# Patient Record
Sex: FEMALE | Race: BLACK OR AFRICAN AMERICAN | Employment: OTHER | URBAN - METROPOLITAN AREA
[De-identification: names, ages, dates, MRNs, and addresses within clinical notes are randomized per-mention and may not be internally consistent; named-entity substitution may affect disease eponyms.]

---

## 2017-01-30 ENCOUNTER — HOSPITAL ENCOUNTER (OUTPATIENT)
Dept: MAMMOGRAPHY | Facility: HOSPITAL | Age: 57
Discharge: HOME | End: 2017-01-30
Attending: INTERNAL MEDICINE
Payer: MEDICAID

## 2017-01-30 ENCOUNTER — HOSPITAL ENCOUNTER (OUTPATIENT)
Dept: RESPIRATORY THERAPY | Facility: HOSPITAL | Age: 57
Discharge: HOME | End: 2017-01-30
Attending: INTERNAL MEDICINE
Payer: MEDICAID

## 2017-01-30 DIAGNOSIS — Z12.31 ENCOUNTER FOR SCREENING MAMMOGRAM FOR MALIGNANT NEOPLASM OF BREAST: ICD-10-CM

## 2017-01-30 DIAGNOSIS — R06.02 SHORTNESS OF BREATH: ICD-10-CM

## 2017-01-30 PROCEDURE — 94060 EVALUATION OF WHEEZING: CPT | Performed by: INTERNAL MEDICINE

## 2017-01-30 PROCEDURE — 94729 DIFFUSING CAPACITY: CPT | Performed by: INTERNAL MEDICINE

## 2017-01-30 PROCEDURE — 94726 PLETHYSMOGRAPHY LUNG VOLUMES: CPT | Performed by: INTERNAL MEDICINE

## 2017-01-30 PROCEDURE — 77067 SCR MAMMO BI INCL CAD: CPT

## 2017-02-08 NOTE — ADDENDUM NOTE
Encounter addended by: Michelle Jones MD on: 2/8/2017  4:15 PM<BR>     Documentation filed: Charges VN, Notes Section

## 2017-02-08 NOTE — PROCEDURES
[facility]    Patient's Name Emani Nur MRN [id number]    1960 Pulmonologist Jackie Seay MD   Location [address] [place] [address] PCP Mabel Garcia MD       The PFTs are Normal. The DLCO is low normal.    Please

## 2017-04-04 DIAGNOSIS — D64.9 ANEMIA, UNSPECIFIED TYPE: Primary | ICD-10-CM

## 2017-04-05 ENCOUNTER — OFFICE VISIT (OUTPATIENT)
Dept: HEMATOLOGY/ONCOLOGY | Facility: HOSPITAL | Age: 57
End: 2017-04-05
Attending: INTERNAL MEDICINE
Payer: MEDICAID

## 2017-04-05 ENCOUNTER — LAB ENCOUNTER (OUTPATIENT)
Dept: LAB | Facility: HOSPITAL | Age: 57
End: 2017-04-05
Attending: INTERNAL MEDICINE
Payer: MEDICAID

## 2017-04-05 VITALS
SYSTOLIC BLOOD PRESSURE: 102 MMHG | RESPIRATION RATE: 16 BRPM | HEIGHT: 66 IN | WEIGHT: 127 LBS | BODY MASS INDEX: 20.41 KG/M2 | HEART RATE: 78 BPM | DIASTOLIC BLOOD PRESSURE: 66 MMHG | TEMPERATURE: 99 F

## 2017-04-05 DIAGNOSIS — D64.9 ANEMIA, UNSPECIFIED TYPE: ICD-10-CM

## 2017-04-05 DIAGNOSIS — D50.9 MICROCYTIC ANEMIA: Primary | ICD-10-CM

## 2017-04-05 PROCEDURE — 99204 OFFICE O/P NEW MOD 45 MIN: CPT | Performed by: INTERNAL MEDICINE

## 2017-04-05 PROCEDURE — 84466 ASSAY OF TRANSFERRIN: CPT

## 2017-04-05 PROCEDURE — 83540 ASSAY OF IRON: CPT

## 2017-04-05 PROCEDURE — 85025 COMPLETE CBC W/AUTO DIFF WBC: CPT

## 2017-04-05 PROCEDURE — 80053 COMPREHEN METABOLIC PANEL: CPT

## 2017-04-05 PROCEDURE — 36415 COLL VENOUS BLD VENIPUNCTURE: CPT

## 2017-04-05 NOTE — PROGRESS NOTES
Hematology Consultation Note    Patient Name: Desiree Alas   YOB: 1960   Medical Record Number: [id number]   CSN: [id number]   Consulting Physician: Juan Luis Leal MD  Referring Physician(s): Flakito Perez MD  Date of Consultation: 4/5/20 Lung cancer   • Cancer Maternal Aunt      Lung cancer   • Bleeding Disorders Neg    • Clotting Disorder Neg        Social History:    Social History   Marital Status:   Spouse Name: N/A    Years of Education: N/A  Number of Children: N/A     Tristin Damon Wt 57.607 kg (127 lb)  BMI 20.51 kg/m2    Physical Examination:    General: Patient is alert and oriented x 3, not in acute distress. Psych: Calm, cooperative with appropriate questions and responses  HEENT: EOMs intact. Oropharynx is clear.    Neck: No p loss.    Plan:    1.) Anemia    --Ligia Quinonez symptoms of dyspnea on exertion and mild fatigue might likely be related to her anemia; however, she does admit to a history of asthma in the past which might be contributory  --Patient reports a history of thalassem to at age 63  --Strongly recommended the patient continue with screening mammograms up to age 78 as indicated    Emotional Well Being:    Emotional Well Being discussed, patient aware of support systems available through the [facility]. No acute issues.

## 2017-04-05 NOTE — PATIENT INSTRUCTIONS
Please, go for additional blood work today at the [facility]    Please, return for a follow up with me in ABOUT 1 week

## 2017-04-06 ENCOUNTER — APPOINTMENT (OUTPATIENT)
Dept: LAB | Facility: HOSPITAL | Age: 57
End: 2017-04-06
Attending: INTERNAL MEDICINE
Payer: MEDICAID

## 2017-04-06 DIAGNOSIS — D50.9 MICROCYTIC ANEMIA: ICD-10-CM

## 2017-04-06 PROCEDURE — 83010 ASSAY OF HAPTOGLOBIN QUANT: CPT

## 2017-04-06 PROCEDURE — 82607 VITAMIN B-12: CPT

## 2017-04-06 PROCEDURE — 82728 ASSAY OF FERRITIN: CPT

## 2017-04-06 PROCEDURE — 83021 HEMOGLOBIN CHROMOTOGRAPHY: CPT

## 2017-04-06 PROCEDURE — 82746 ASSAY OF FOLIC ACID SERUM: CPT

## 2017-04-06 PROCEDURE — 83020 HEMOGLOBIN ELECTROPHORESIS: CPT

## 2017-04-06 PROCEDURE — 84443 ASSAY THYROID STIM HORMONE: CPT

## 2017-04-06 PROCEDURE — 83615 LACTATE (LD) (LDH) ENZYME: CPT

## 2017-04-06 PROCEDURE — 36415 COLL VENOUS BLD VENIPUNCTURE: CPT

## 2017-04-06 PROCEDURE — 86880 COOMBS TEST DIRECT: CPT

## 2017-04-13 ENCOUNTER — OFFICE VISIT (OUTPATIENT)
Dept: HEMATOLOGY/ONCOLOGY | Facility: HOSPITAL | Age: 57
End: 2017-04-13
Attending: INTERNAL MEDICINE
Payer: MEDICAID

## 2017-04-13 VITALS
RESPIRATION RATE: 18 BRPM | TEMPERATURE: 98 F | SYSTOLIC BLOOD PRESSURE: 108 MMHG | BODY MASS INDEX: 20.57 KG/M2 | DIASTOLIC BLOOD PRESSURE: 65 MMHG | WEIGHT: 128 LBS | HEART RATE: 75 BPM | HEIGHT: 66 IN

## 2017-04-13 DIAGNOSIS — D50.9 MICROCYTIC ANEMIA: Primary | ICD-10-CM

## 2017-04-13 PROCEDURE — 99215 OFFICE O/P EST HI 40 MIN: CPT | Performed by: INTERNAL MEDICINE

## 2017-04-13 NOTE — PROGRESS NOTES
Hematology Progress Note    Patient Name: Sandra Walter   YOB: 1960   Medical Record Number: [id number]   CSN: [id number]   Consulting Physician: Bernadette Winters MD  Referring Physician(s): Lydia Bartholomew MD  Date of Visit: 4/13/2017     Inell Fear sweats and weight loss. +fatigue  Eyes: Negative for visual disturbance, irritation and redness. Respiratory: Negative for cough, hemoptysis, chest pain, +dyspnea on exertion.   Gastrointestinal: Negative for dysphagia, odynophagia, reflux symptoms, nausea Date/Time   GLU 91 04/05/2017 07:19 AM   BUN 14 04/05/2017 07:19 AM   CREATSERUM 1.02 04/05/2017 07:19 AM   GFRNAA 56* 04/05/2017 07:19 AM   CA 9.5 04/05/2017 07:19 AM   ALB 4.0 04/05/2017 07:19 AM    04/05/2017 07:19 AM   K 4.4 04/05/2017 07:19 AM  ng/mL   61   Vitamin B12 Latest Ref Range: 181-914 pg/mL   809   FOLATE (FOLIC ACID), SERUM Latest Units: ng/mL   17.8   Haptoglobin Latest Ref Range:  mg/dL   141   A/G RATIO Latest Ref Range: 1.0-2.0  1.3     TSH Latest Ref Range: 0.34-5.60 a normal differential suggesting no signs of an underlying bone marrow disorder  --Discussed with patient that since we've ruled out the above other types of anemia, it's likely a thalassemia associated anemia she would not require any additional treatment

## 2017-10-16 ENCOUNTER — LAB ENCOUNTER (OUTPATIENT)
Dept: LAB | Facility: HOSPITAL | Age: 57
End: 2017-10-16
Attending: INTERNAL MEDICINE
Payer: MEDICAID

## 2017-10-16 ENCOUNTER — OFFICE VISIT (OUTPATIENT)
Dept: HEMATOLOGY/ONCOLOGY | Facility: HOSPITAL | Age: 57
End: 2017-10-16
Attending: INTERNAL MEDICINE
Payer: MEDICAID

## 2017-10-16 ENCOUNTER — HOSPITAL ENCOUNTER (OUTPATIENT)
Dept: GENERAL RADIOLOGY | Facility: HOSPITAL | Age: 57
Discharge: HOME | End: 2017-10-16
Attending: INTERNAL MEDICINE
Payer: MEDICAID

## 2017-10-16 VITALS
SYSTOLIC BLOOD PRESSURE: 109 MMHG | RESPIRATION RATE: 16 BRPM | HEART RATE: 81 BPM | DIASTOLIC BLOOD PRESSURE: 68 MMHG | HEIGHT: 66 IN | WEIGHT: 134 LBS | TEMPERATURE: 97 F | BODY MASS INDEX: 21.53 KG/M2

## 2017-10-16 DIAGNOSIS — D64.9 ANEMIA, UNSPECIFIED TYPE: ICD-10-CM

## 2017-10-16 DIAGNOSIS — M25.542 ARTHRALGIA OF BOTH HANDS: ICD-10-CM

## 2017-10-16 DIAGNOSIS — M79.643 HAND PAIN: ICD-10-CM

## 2017-10-16 DIAGNOSIS — D64.9 ANEMIA, UNSPECIFIED TYPE: Primary | ICD-10-CM

## 2017-10-16 DIAGNOSIS — M25.541 ARTHRALGIA OF BOTH HANDS: ICD-10-CM

## 2017-10-16 PROCEDURE — 86038 ANTINUCLEAR ANTIBODIES: CPT

## 2017-10-16 PROCEDURE — 36415 COLL VENOUS BLD VENIPUNCTURE: CPT

## 2017-10-16 PROCEDURE — 73130 X-RAY EXAM OF HAND: CPT | Performed by: INTERNAL MEDICINE

## 2017-10-16 PROCEDURE — 85025 COMPLETE CBC W/AUTO DIFF WBC: CPT

## 2017-10-16 PROCEDURE — 99214 OFFICE O/P EST MOD 30 MIN: CPT | Performed by: INTERNAL MEDICINE

## 2017-10-16 PROCEDURE — 99212 OFFICE O/P EST SF 10 MIN: CPT | Performed by: INTERNAL MEDICINE

## 2017-10-16 NOTE — PATIENT INSTRUCTIONS
Please, go to the [facility] today to have your labs drawn. Recommend using vitamin B12 supplement daily. Recommend taking 1,000 mcg (micrograms) daily.  Then return to clinic for follow up in 3 months

## 2017-10-16 NOTE — PROGRESS NOTES
Hematology Progress Note    Patient Name: Nena Luong   YOB: 1960   Medical Record Number: [id number]   CSN: [id number]  Consulting Physician: Maria Luisa Jay MD  Referring Physician(s): Joel Looney MD  Date of Visit: 10/16/2017     Louisa Beverly awareness in her 20's; unsure about childhood   • Anxiety     situational; with driving   • Asthma      Past Surgical History:  1998: FOOT SURGERY Bilateral  1995: TUBAL LIGATION  Family History   Problem Relation Age of Onset   • Diabetes Mother    • Canc arthralgias, muscle weakness. Neurological: Negative for headaches, dizziness, speech problems, gait problems and weakness. A comprehensive 14 point review of systems was completed. Pertinent positives and negatives noted in the the HPI.      Vital Sig Anemia, unspecified type  (primary encounter diagnosis)  Plan: CBC WITH DIFFERENTIAL WITH PLATELET, GINA,         DIRECT SCREEN    (M25.541,  M25.542) Arthralgia of both hands  Plan: GINA, DIRECT SCREEN  22-year-old F with PMH of alpha thalassemia related an thyroid function, iron studies normal, no evidence of hemolysis, no evidence of nutritional causes of anemia such as folate deficiency    --CBC and CMP on admission show no evidence of any renal dysfunction which might be contributing to her anemia  --Eval   [address], [facility]

## 2017-10-19 ENCOUNTER — TELEPHONE (OUTPATIENT)
Dept: HEMATOLOGY/ONCOLOGY | Facility: HOSPITAL | Age: 57
End: 2017-10-19

## 2017-10-19 DIAGNOSIS — D64.9 ANEMIA: Primary | ICD-10-CM

## 2017-10-19 NOTE — TELEPHONE ENCOUNTER
Per Dr Alon Leon instruction: patient contacted and advised her labs look stable and her GINA indicates no sign of autoimmune component explaining her pain in her hands. She was told she has mild arthritis in her hands based on x ray.  She understands that her

## 2018-01-19 ENCOUNTER — TELEPHONE (OUTPATIENT)
Dept: HEMATOLOGY/ONCOLOGY | Facility: HOSPITAL | Age: 58
End: 2018-01-19

## 2018-01-19 NOTE — TELEPHONE ENCOUNTER
Zhao Jaffe is \"requesting a call back from Dr. Benjy Coronado RN. I have not been taking the Vitamins. Will he still want to see me or should I reschedule this appointment? \" I assured her I would send her message. I called to confirm the visit for 1/22.

## 2018-01-22 ENCOUNTER — OFFICE VISIT (OUTPATIENT)
Dept: HEMATOLOGY/ONCOLOGY | Facility: HOSPITAL | Age: 58
End: 2018-01-22
Attending: INTERNAL MEDICINE
Payer: MEDICAID

## 2018-01-22 ENCOUNTER — LAB ENCOUNTER (OUTPATIENT)
Dept: LAB | Facility: HOSPITAL | Age: 58
End: 2018-01-22
Attending: INTERNAL MEDICINE
Payer: MEDICAID

## 2018-01-22 VITALS
SYSTOLIC BLOOD PRESSURE: 106 MMHG | RESPIRATION RATE: 16 BRPM | BODY MASS INDEX: 20.73 KG/M2 | HEIGHT: 66 IN | HEART RATE: 93 BPM | TEMPERATURE: 97 F | WEIGHT: 129 LBS | DIASTOLIC BLOOD PRESSURE: 40 MMHG

## 2018-01-22 DIAGNOSIS — E53.8 VITAMIN B12 DEFICIENCY: ICD-10-CM

## 2018-01-22 DIAGNOSIS — D64.9 ANEMIA: ICD-10-CM

## 2018-01-22 DIAGNOSIS — D50.9 MICROCYTIC ANEMIA: Primary | ICD-10-CM

## 2018-01-22 LAB
BASOPHILS # BLD: 0 K/UL (ref 0–0.2)
BASOPHILS NFR BLD: 1 %
EOSINOPHIL # BLD: 0.2 K/UL (ref 0–0.7)
EOSINOPHIL NFR BLD: 5 %
ERYTHROCYTE [DISTWIDTH] IN BLOOD BY AUTOMATED COUNT: 15.6 % (ref 11–15)
HCT VFR BLD AUTO: 32.2 % (ref 35–48)
HGB BLD-MCNC: 10.1 G/DL (ref 12–16)
LYMPHOCYTES # BLD: 1.3 K/UL (ref 1–4)
LYMPHOCYTES NFR BLD: 27 %
MCH RBC QN AUTO: 21.6 PG (ref 27–32)
MCHC RBC AUTO-ENTMCNC: 31.5 G/DL (ref 32–37)
MCV RBC AUTO: 68.6 FL (ref 80–100)
MONOCYTES # BLD: 0.7 K/UL (ref 0–1)
MONOCYTES NFR BLD: 15 %
NEUTROPHILS # BLD AUTO: 2.6 K/UL (ref 1.8–7.7)
NEUTROPHILS NFR BLD: 53 %
PLATELET # BLD AUTO: 172 K/UL (ref 140–400)
PMV BLD AUTO: 9.6 FL (ref 7.4–10.3)
RBC # BLD AUTO: 4.7 M/UL (ref 3.7–5.4)
WBC # BLD AUTO: 4.9 K/UL (ref 4–11)

## 2018-01-22 PROCEDURE — 85025 COMPLETE CBC W/AUTO DIFF WBC: CPT

## 2018-01-22 PROCEDURE — 85060 BLOOD SMEAR INTERPRETATION: CPT

## 2018-01-22 PROCEDURE — 36415 COLL VENOUS BLD VENIPUNCTURE: CPT

## 2018-01-22 PROCEDURE — 99212 OFFICE O/P EST SF 10 MIN: CPT | Performed by: INTERNAL MEDICINE

## 2018-01-22 PROCEDURE — 99213 OFFICE O/P EST LOW 20 MIN: CPT | Performed by: INTERNAL MEDICINE

## 2018-01-22 NOTE — PROGRESS NOTES
Hematology Progress Note    Patient Name: Ruthy Osborne   YOB: 1960   Medical Record Number: [id number]   CSN: [id number]  Consulting Physician: Sonny Thayer MD  Referring Physician(s): Shirley Apley, MD  Date of Visit: 1/22/2018    Chief systems is otherwise negative.     Past Medical History:   Diagnosis Date   • Anemia     awareness in her 19's; unsure about childhood   • Anxiety     situational; with driving   • Asthma      Past Surgical History:  1998: FOOT SURGERY Bilateral  1995: TUBA lymphadenopathy. Musculoskeletal: Negative for myalgias, arthralgias, muscle weakness. Neurological: Negative for headaches, dizziness, speech problems, gait problems and weakness. A comprehensive 14 point review of systems was completed.   Pertinent 19 04/05/2017 07:19 AM         Impression:    (D50.9) Microcytic anemia  (primary encounter diagnosis)  Plan: CBC WITH DIFFERENTIAL WITH PLATELET, VITAMIN         B12    (E53.8) Vitamin B12 deficiency  Plan: CBC WITH DIFFERENTIAL WITH PLATELET, VITAMIN evidence of any renal dysfunction which might be contributing to her anemia  --Evaluation of patient's CBC shows a normal WBC and platelets, with a normal differential suggesting no signs of an underlying bone marrow disorder    --Also discussed this might

## 2018-07-22 ENCOUNTER — LAB ENCOUNTER (OUTPATIENT)
Dept: LAB | Facility: HOSPITAL | Age: 58
End: 2018-07-22
Attending: INTERNAL MEDICINE
Payer: MEDICAID

## 2018-07-22 DIAGNOSIS — E53.8 VITAMIN B12 DEFICIENCY: ICD-10-CM

## 2018-07-22 DIAGNOSIS — D50.9 MICROCYTIC ANEMIA: ICD-10-CM

## 2018-07-22 DIAGNOSIS — D56.3 THALASSEMIA MINOR: ICD-10-CM

## 2018-07-22 LAB
BASOPHILS # BLD: 0 K/UL (ref 0–0.2)
BASOPHILS NFR BLD: 1 %
EOSINOPHIL # BLD: 0.2 K/UL (ref 0–0.7)
EOSINOPHIL NFR BLD: 4 %
ERYTHROCYTE [DISTWIDTH] IN BLOOD BY AUTOMATED COUNT: 15.9 % (ref 11–15)
HCT VFR BLD AUTO: 33.3 % (ref 35–48)
HGB BLD-MCNC: 10.5 G/DL (ref 12–16)
LYMPHOCYTES # BLD: 1.5 K/UL (ref 1–4)
LYMPHOCYTES NFR BLD: 25 %
MCH RBC QN AUTO: 21.9 PG (ref 27–32)
MCHC RBC AUTO-ENTMCNC: 31.5 G/DL (ref 32–37)
MCV RBC AUTO: 69.5 FL (ref 80–100)
MONOCYTES # BLD: 0.6 K/UL (ref 0–1)
MONOCYTES NFR BLD: 9 %
NEUTROPHILS # BLD AUTO: 3.7 K/UL (ref 1.8–7.7)
NEUTROPHILS NFR BLD: 61 %
PLATELET # BLD AUTO: 212 K/UL (ref 140–400)
PMV BLD AUTO: 9.4 FL (ref 7.4–10.3)
RBC # BLD AUTO: 4.79 M/UL (ref 3.7–5.4)
VIT B12 SERPL-MCNC: 474 PG/ML (ref 181–914)
WBC # BLD AUTO: 6.1 K/UL (ref 4–11)

## 2018-07-22 PROCEDURE — 82607 VITAMIN B-12: CPT

## 2018-07-22 PROCEDURE — 85025 COMPLETE CBC W/AUTO DIFF WBC: CPT

## 2018-07-22 PROCEDURE — 36415 COLL VENOUS BLD VENIPUNCTURE: CPT

## 2018-07-22 PROCEDURE — 82728 ASSAY OF FERRITIN: CPT

## 2018-07-22 PROCEDURE — 84466 ASSAY OF TRANSFERRIN: CPT

## 2018-07-22 PROCEDURE — 83540 ASSAY OF IRON: CPT

## 2018-07-23 ENCOUNTER — OFFICE VISIT (OUTPATIENT)
Dept: HEMATOLOGY/ONCOLOGY | Facility: HOSPITAL | Age: 58
End: 2018-07-23
Attending: INTERNAL MEDICINE
Payer: MEDICAID

## 2018-07-23 VITALS
TEMPERATURE: 98 F | SYSTOLIC BLOOD PRESSURE: 99 MMHG | HEART RATE: 74 BPM | WEIGHT: 135 LBS | RESPIRATION RATE: 16 BRPM | DIASTOLIC BLOOD PRESSURE: 64 MMHG | BODY MASS INDEX: 21.69 KG/M2 | HEIGHT: 66 IN

## 2018-07-23 DIAGNOSIS — D56.3 THALASSEMIA MINOR: ICD-10-CM

## 2018-07-23 DIAGNOSIS — E53.8 VITAMIN B12 DEFICIENCY: ICD-10-CM

## 2018-07-23 DIAGNOSIS — D50.9 MICROCYTIC ANEMIA: Primary | ICD-10-CM

## 2018-07-23 LAB
FERRITIN SERPL IA-MCNC: 62 NG/ML (ref 11–307)
IRON SATN MFR SERPL: 12 % (ref 15–50)
IRON SERPL-MCNC: 38 MCG/DL (ref 28–170)
TIBC SERPL-MCNC: 330 MCG/DL (ref 228–428)
TRANSFERRIN SERPL-MCNC: 250 MG/DL (ref 192–382)

## 2018-07-23 PROCEDURE — 99214 OFFICE O/P EST MOD 30 MIN: CPT | Performed by: INTERNAL MEDICINE

## 2018-07-23 NOTE — PROGRESS NOTES
Hematology Progress Note    Patient Name: Kali Williamson   YOB: 1960   Medical Record Number: [id number]   CSN: [id number]  Consulting Physician: Pio Hurst MD  Referring Physician(s): Jose Alejandro Alegria MD  Date of Visit: 7/23/2018    Chief any active blood loss from any orifice. Review of systems is otherwise negative for any systemic signs of illness.     Past Medical History:   Diagnosis Date   • Anemia     awareness in her 19's; unsure about childhood   • Anxiety     situational; with dri lesions, and pruritus. Hematologic/lymphatic: Negative for easy bruising, bleeding, and lymphadenopathy. Musculoskeletal: Negative for myalgias, arthralgias, muscle weakness.   Neurological: Negative for headaches, dizziness, speech problems, gait proble 04/05/2017 07:19 AM   ALKPHO 86 04/05/2017 07:19 AM   AST 25 04/05/2017 07:19 AM   ALT 19 04/05/2017 07:19 AM         Impression:    (D50.9) Microcytic anemia  (primary encounter diagnosis)  Plan: IRON AND TIBC, FERRITIN, CBC WITH DIFFERENTIAL         WITH beta thalassemia either minor based on her -American background ethnicity; however, lab testing consistent with alpha thalassemia. Does not require tx.       --She has no evidence of  iron deficiency anemia by testing and takes no medications that co

## 2018-08-21 ENCOUNTER — HOSPITAL ENCOUNTER (EMERGENCY)
Facility: HOSPITAL | Age: 58
Discharge: HOME | End: 2018-08-21
Attending: PHYSICIAN ASSISTANT
Payer: MEDICAID

## 2018-08-21 VITALS
BODY MASS INDEX: 20.89 KG/M2 | HEART RATE: 76 BPM | OXYGEN SATURATION: 100 % | WEIGHT: 130 LBS | DIASTOLIC BLOOD PRESSURE: 69 MMHG | TEMPERATURE: 97 F | RESPIRATION RATE: 20 BRPM | HEIGHT: 66 IN | SYSTOLIC BLOOD PRESSURE: 106 MMHG

## 2018-08-21 DIAGNOSIS — J02.9 PHARYNGITIS, UNSPECIFIED ETIOLOGY: Primary | ICD-10-CM

## 2018-08-21 LAB — S PYO AG THROAT QL: NEGATIVE

## 2018-08-21 PROCEDURE — 87430 STREP A AG IA: CPT

## 2018-08-21 PROCEDURE — 99283 EMERGENCY DEPT VISIT LOW MDM: CPT

## 2018-08-21 RX ORDER — IBUPROFEN 600 MG/1
TABLET ORAL
Qty: 20 TABLET | Refills: 0 | Status: SHIPPED | OUTPATIENT
Start: 2018-08-21 | End: 2019-04-12

## 2018-08-22 NOTE — ED NOTES
Pr states having a sore throat for 5days. Said that it hurts with swallowing. Also c/o bilat ear pain. Denied being around sick people or fevers.

## 2018-08-22 NOTE — ED PROVIDER NOTES
Patient Seen in: [facility] AND [facility] Emergency Department    History   Patient presents with:  Sore Throat    Stated Complaint: sore throat     HPI    28-year-old female presents with chief complaint of sore throat. Onset 4 days ago.   Patient denies sick as otherwise stated in HPI.     Physical Exam   ED Triage Vitals [08/21/18 2058]  BP: 104/75  Pulse: 71  Resp: 18  Temp: 97.7 °F (36.5 °C)  Temp src: Oral  SpO2: 100 %  O2 Device: None (Room air)    Current:/75   Pulse 71   Temp 97.7 °F (36.5 °C) (Ora remained stable over serial reexaminations as previously documented. Results reviewed and need for follow-up discussed with patient.     Disposition and Plan     Clinical Impression:  Pharyngitis, unspecified etiology  (primary encounter diagnosis)    Disp

## 2018-08-22 NOTE — ED INITIAL ASSESSMENT (HPI)
Pt reports difficulty in swallowing, soreness and dryness in throat x4 days. Pt denies fevers/chills.

## 2018-09-01 ENCOUNTER — HOSPITAL ENCOUNTER (OUTPATIENT)
Dept: GENERAL RADIOLOGY | Facility: HOSPITAL | Age: 58
Discharge: HOME | End: 2018-09-01
Attending: INTERNAL MEDICINE
Payer: MEDICAID

## 2018-09-01 DIAGNOSIS — F45.8 OTHER SOMATOFORM DISORDERS: ICD-10-CM

## 2018-09-01 PROCEDURE — 74220 X-RAY XM ESOPHAGUS 1CNTRST: CPT | Performed by: INTERNAL MEDICINE

## 2018-09-10 ENCOUNTER — OFFICE VISIT (OUTPATIENT)
Dept: OTOLARYNGOLOGY | Facility: CLINIC | Age: 58
End: 2018-09-10
Payer: MEDICAID

## 2018-09-10 VITALS
WEIGHT: 132 LBS | DIASTOLIC BLOOD PRESSURE: 60 MMHG | TEMPERATURE: 97 F | HEIGHT: 66 IN | SYSTOLIC BLOOD PRESSURE: 90 MMHG | BODY MASS INDEX: 21.21 KG/M2

## 2018-09-10 DIAGNOSIS — K21.9 LARYNGOPHARYNGEAL REFLUX (LPR): Primary | ICD-10-CM

## 2018-09-10 PROBLEM — R68.2 DRY MOUTH, UNSPECIFIED: Status: ACTIVE | Noted: 2018-09-10

## 2018-09-10 PROCEDURE — 31575 DIAGNOSTIC LARYNGOSCOPY: CPT | Performed by: OTOLARYNGOLOGY

## 2018-09-10 PROCEDURE — 99244 OFF/OP CNSLTJ NEW/EST MOD 40: CPT | Performed by: OTOLARYNGOLOGY

## 2018-09-10 PROCEDURE — 99212 OFFICE O/P EST SF 10 MIN: CPT | Performed by: OTOLARYNGOLOGY

## 2018-09-10 RX ORDER — OMEPRAZOLE 40 MG/1
40 CAPSULE, DELAYED RELEASE ORAL DAILY
Qty: 30 CAPSULE | Refills: 2 | Status: SHIPPED | OUTPATIENT
Start: 2018-09-10 | End: 2019-04-12

## 2018-09-10 NOTE — PROGRESS NOTES
Leon Irene is a 66year old female. Patient presents with:  Throat Problem: throat pain for a month, c/o dry throat for a while      HISTORY OF PRESENT ILLNESS  9/10/2018  Patient presents for evaluation of a swallowing issue.  This has been going on f Alcohol/week: 0.0 oz      Drug use: No      Sexual activity: Not on file    Other Topics      Concerns:        Not on file    Social History Narrative      Patient is remarried; after 6 yrs to her spouse, Houston Michael.  She lives with her , adult daughter, Conjunctiva - Right: Normal, Left: Normal. Pupil - Right: Normal, Left: Normal.     Neurological Normal Memory - Normal. Cranial nerves - Cranial nerves II through XII grossly intact.    Head/Face Normal Facial features - Normal. Eyebrows - Normal. Skull - (40 mg total) by mouth daily.  Before a meal, Disp: 30 capsule, Rfl: 2  •  ibuprofen 600 MG Oral Tab, Take 1 tablet (600 mg total) by mouth every 6 hours with food, Disp: 20 tablet, Rfl: 0  •  Phenol-Glycerin (CHLORASEPTIC MAX SORE THROAT) 1.5-33 % Mouth/Th

## 2018-10-27 ENCOUNTER — HOSPITAL ENCOUNTER (OUTPATIENT)
Dept: MAMMOGRAPHY | Facility: HOSPITAL | Age: 58
Discharge: HOME | End: 2018-10-27
Attending: INTERNAL MEDICINE
Payer: COMMERCIAL

## 2018-10-27 DIAGNOSIS — Z12.39 ENCOUNTER FOR SCREENING FOR MALIGNANT NEOPLASM OF BREAST: ICD-10-CM

## 2018-10-27 PROCEDURE — 77067 SCR MAMMO BI INCL CAD: CPT | Performed by: INTERNAL MEDICINE

## 2018-10-27 PROCEDURE — 77063 BREAST TOMOSYNTHESIS BI: CPT | Performed by: INTERNAL MEDICINE

## 2018-10-29 ENCOUNTER — TELEPHONE (OUTPATIENT)
Dept: OTOLARYNGOLOGY | Facility: CLINIC | Age: 58
End: 2018-10-29

## 2018-10-29 RX ORDER — OMEPRAZOLE 40 MG/1
40 CAPSULE, DELAYED RELEASE ORAL DAILY
Qty: 30 CAPSULE | Refills: 2 | Status: SHIPPED | OUTPATIENT
Start: 2018-10-29 | End: 2019-07-22 | Stop reason: ALTCHOICE

## 2018-10-29 NOTE — TELEPHONE ENCOUNTER
Pt asking for omeprazole prescription to be sent to [facility], states her insurance will not cover [facility]. Pls advise thank you.

## 2019-04-12 PROBLEM — Z80.0 FAMILY HISTORY OF COLON CANCER: Status: ACTIVE | Noted: 2019-04-12

## 2019-04-22 PROCEDURE — 88305 TISSUE EXAM BY PATHOLOGIST: CPT | Performed by: INTERNAL MEDICINE

## 2019-06-17 NOTE — MR AVS SNAPSHOT
Nena Luong   2017 8:30 AM   Office Visit   MRN:  [id number]    Description:  Female : 1960   Provider:  Christelle Escalona   Department:  [facility] AND [facility] Hematology Oncology              Visit Summary      Primary Visit Diagnosis visit,  view other health information, and more. To sign up or find more information, go to [email or web address] and click on the Sign Up Now link in the [employer] box.      Enter your [employer] Activation Code exactly as it appears below along with yo  used

## 2019-07-04 ENCOUNTER — HOSPITAL ENCOUNTER (EMERGENCY)
Facility: HOSPITAL | Age: 59
Discharge: HOME | End: 2019-07-04
Attending: EMERGENCY MEDICINE
Payer: COMMERCIAL

## 2019-07-04 VITALS
OXYGEN SATURATION: 100 % | HEIGHT: 66 IN | DIASTOLIC BLOOD PRESSURE: 65 MMHG | RESPIRATION RATE: 16 BRPM | SYSTOLIC BLOOD PRESSURE: 108 MMHG | HEART RATE: 71 BPM | BODY MASS INDEX: 21.69 KG/M2 | WEIGHT: 135 LBS | TEMPERATURE: 99 F

## 2019-07-04 DIAGNOSIS — L03.119 CELLULITIS OF LOWER EXTREMITY, UNSPECIFIED LATERALITY: ICD-10-CM

## 2019-07-04 DIAGNOSIS — L30.9 DERMATITIS: Primary | ICD-10-CM

## 2019-07-04 PROCEDURE — 99283 EMERGENCY DEPT VISIT LOW MDM: CPT

## 2019-07-04 RX ORDER — METHYLPREDNISOLONE 4 MG/1
TABLET ORAL
Qty: 1 PACKAGE | Refills: 0 | Status: SHIPPED | OUTPATIENT
Start: 2019-07-04 | End: 2019-07-09

## 2019-07-04 RX ORDER — CEFADROXIL 500 MG/1
500 CAPSULE ORAL 2 TIMES DAILY
Qty: 20 CAPSULE | Refills: 0 | Status: SHIPPED | OUTPATIENT
Start: 2019-07-04 | End: 2019-07-14

## 2019-07-04 NOTE — ED PROVIDER NOTES
Patient Seen in: [facility] AND [facility] Emergency Department    History   Patient presents with:  Swelling Edema (cardiovascular, metabolic)    Stated Complaint: L leg swelling     HPI    Patient is 68-year-old female who presents with right lower leg redness motor. Normal gait  SKIN: +erythema to right anterior leg. No warmth/fluctuance/induration. No bulla. Erythema 7 x 3 cm. ED Course   Labs Reviewed - No data to display    MDM   Pt showed pictures of inflammation over last few days.  Appears to be stable

## 2019-07-04 NOTE — ED INITIAL ASSESSMENT (HPI)
Pt has some swelling and discoloration to the right shin. States, \"I think something bit me. \"  Incident occurred on Saturday and continues to get worse.

## 2019-07-22 ENCOUNTER — LAB ENCOUNTER (OUTPATIENT)
Dept: LAB | Facility: HOSPITAL | Age: 59
End: 2019-07-22
Attending: INTERNAL MEDICINE
Payer: COMMERCIAL

## 2019-07-22 ENCOUNTER — OFFICE VISIT (OUTPATIENT)
Dept: HEMATOLOGY/ONCOLOGY | Facility: HOSPITAL | Age: 59
End: 2019-07-22
Attending: INTERNAL MEDICINE
Payer: COMMERCIAL

## 2019-07-22 VITALS
TEMPERATURE: 98 F | OXYGEN SATURATION: 100 % | HEIGHT: 66 IN | RESPIRATION RATE: 16 BRPM | BODY MASS INDEX: 22.82 KG/M2 | HEART RATE: 68 BPM | SYSTOLIC BLOOD PRESSURE: 92 MMHG | DIASTOLIC BLOOD PRESSURE: 55 MMHG | WEIGHT: 142 LBS

## 2019-07-22 DIAGNOSIS — D50.9 MICROCYTIC ANEMIA: Primary | ICD-10-CM

## 2019-07-22 DIAGNOSIS — E53.8 VITAMIN B12 DEFICIENCY: ICD-10-CM

## 2019-07-22 DIAGNOSIS — D56.3 THALASSEMIA MINOR: ICD-10-CM

## 2019-07-22 DIAGNOSIS — D50.9 MICROCYTIC ANEMIA: ICD-10-CM

## 2019-07-22 LAB
BASOPHILS # BLD AUTO: 0.03 X10(3) UL (ref 0–0.2)
BASOPHILS NFR BLD AUTO: 0.5 %
DEPRECATED HBV CORE AB SER IA-ACNC: 60.1 NG/ML (ref 18–340)
DEPRECATED RDW RBC AUTO: 42.4 FL (ref 35.1–46.3)
EOSINOPHIL # BLD AUTO: 0.24 X10(3) UL (ref 0–0.7)
EOSINOPHIL NFR BLD AUTO: 4.3 %
ERYTHROCYTE [DISTWIDTH] IN BLOOD BY AUTOMATED COUNT: 16.4 % (ref 11–15)
FOLATE SERPL-MCNC: 19.4 NG/ML (ref 8.7–?)
HAPTOGLOB SERPL-MCNC: 145 MG/DL (ref 30–200)
HCT VFR BLD AUTO: 33.8 % (ref 35–48)
HGB BLD-MCNC: 10.4 G/DL (ref 12–16)
HGB RETIC QN AUTO: 24 PG (ref 28.2–36.6)
IMM GRANULOCYTES # BLD AUTO: 0.01 X10(3) UL (ref 0–1)
IMM GRANULOCYTES NFR BLD: 0.2 %
IMM RETICS NFR: 0.12 RATIO (ref 0.1–0.3)
IRON SATURATION: 17 % (ref 15–50)
IRON SERPL-MCNC: 66 UG/DL (ref 50–170)
LDH SERPL L TO P-CCNC: 212 U/L (ref 84–246)
LYMPHOCYTES # BLD AUTO: 1.58 X10(3) UL (ref 1–4)
LYMPHOCYTES NFR BLD AUTO: 28.1 %
MCH RBC QN AUTO: 22.3 PG (ref 26–34)
MCHC RBC AUTO-ENTMCNC: 30.8 G/DL (ref 31–37)
MCV RBC AUTO: 72.4 FL (ref 80–100)
MONOCYTES # BLD AUTO: 0.58 X10(3) UL (ref 0.1–1)
MONOCYTES NFR BLD AUTO: 10.3 %
NEUTROPHILS # BLD AUTO: 3.19 X10 (3) UL (ref 1.5–7.7)
NEUTROPHILS # BLD AUTO: 3.19 X10(3) UL (ref 1.5–7.7)
NEUTROPHILS NFR BLD AUTO: 56.6 %
PLATELET # BLD AUTO: 221 10(3)UL (ref 150–450)
RBC # BLD AUTO: 4.67 X10(6)UL (ref 3.8–5.3)
RETICS # AUTO: 47.2 X10(3) UL (ref 22.5–147.5)
RETICS/RBC NFR AUTO: 1 % (ref 0.5–2.5)
TOTAL IRON BINDING CAPACITY: 386 UG/DL (ref 240–450)
TRANSFERRIN SERPL-MCNC: 259 MG/DL (ref 200–360)
VIT B12 SERPL-MCNC: 729 PG/ML (ref 193–986)
WBC # BLD AUTO: 5.6 X10(3) UL (ref 4–11)

## 2019-07-22 PROCEDURE — 82728 ASSAY OF FERRITIN: CPT

## 2019-07-22 PROCEDURE — 83615 LACTATE (LD) (LDH) ENZYME: CPT

## 2019-07-22 PROCEDURE — 85025 COMPLETE CBC W/AUTO DIFF WBC: CPT

## 2019-07-22 PROCEDURE — 84466 ASSAY OF TRANSFERRIN: CPT

## 2019-07-22 PROCEDURE — 82746 ASSAY OF FOLIC ACID SERUM: CPT

## 2019-07-22 PROCEDURE — 83540 ASSAY OF IRON: CPT

## 2019-07-22 PROCEDURE — 99214 OFFICE O/P EST MOD 30 MIN: CPT | Performed by: INTERNAL MEDICINE

## 2019-07-22 PROCEDURE — 82607 VITAMIN B-12: CPT

## 2019-07-22 PROCEDURE — 83010 ASSAY OF HAPTOGLOBIN QUANT: CPT

## 2019-07-22 PROCEDURE — 36415 COLL VENOUS BLD VENIPUNCTURE: CPT

## 2019-07-22 PROCEDURE — 85045 AUTOMATED RETICULOCYTE COUNT: CPT

## 2019-07-22 NOTE — PROGRESS NOTES
Hematology Progress Note    Patient Name: Nessa Ricci   YOB: 1960   Medical Record Number: [id number]   CSN: [id number]  Consulting Physician: Tj Calhoun MD  Referring Physician(s): Rosetta Peterson MD  Date of Visit: 7/22/2019    Chief findings recommended for annual follow-up in 1 year. She reports being in her usual state of health since her last visit. Patient denies blood loss from any orifice.   Patient denies symptoms of anemia such as chest pain or fatigue, mentions occasiona and Sexual Activity      Alcohol use: No        Alcohol/week: 0.0 standard drinks      Drug use: No      Sexual activity: Not on file    Lifestyle      Physical activity:        Days per week: Not on file        Minutes per session: Not on file      Stress arthralgias, muscle weakness. Neurological: Negative for headaches, dizziness, speech problems, gait problems and weakness. A comprehensive 14 point review of systems was completed. Pertinent positives and negatives noted in the the HPI.      Vital Sig ALT 19 04/05/2017 07:19 AM         Impression:    (D50.9) Microcytic anemia  (primary encounter diagnosis)  Plan: RETICULOCYTE COUNT    (D56.3) Thalassemia minor  64year old F with PMH of thalassemia related anemia presents for continued follow up of m shows a normal WBC and platelets, with a normal differential suggesting no signs of an underlying bone marrow disorder    --No concern for GI losses as her last c-scope was in 4/2019; no evidence of a bleeding source via colonoscopy or EGD.  Pt found to hav

## 2019-12-11 ENCOUNTER — HOSPITAL ENCOUNTER (OUTPATIENT)
Dept: MAMMOGRAPHY | Age: 59
Discharge: HOME | End: 2019-12-11
Attending: INTERNAL MEDICINE
Payer: COMMERCIAL

## 2019-12-11 DIAGNOSIS — Z12.31 ENCOUNTER FOR SCREENING MAMMOGRAM FOR MALIGNANT NEOPLASM OF BREAST: ICD-10-CM

## 2019-12-11 PROCEDURE — 77067 SCR MAMMO BI INCL CAD: CPT | Performed by: INTERNAL MEDICINE

## 2019-12-11 PROCEDURE — 77063 BREAST TOMOSYNTHESIS BI: CPT | Performed by: INTERNAL MEDICINE

## 2020-07-16 ENCOUNTER — OFFICE VISIT (OUTPATIENT)
Dept: OBGYN CLINIC | Facility: CLINIC | Age: 60
End: 2020-07-16
Payer: COMMERCIAL

## 2020-07-16 VITALS
SYSTOLIC BLOOD PRESSURE: 100 MMHG | DIASTOLIC BLOOD PRESSURE: 64 MMHG | BODY MASS INDEX: 22 KG/M2 | WEIGHT: 139.19 LBS | HEART RATE: 64 BPM

## 2020-07-16 DIAGNOSIS — Z12.4 CERVICAL CANCER SCREENING: ICD-10-CM

## 2020-07-16 DIAGNOSIS — N95.2 VAGINAL ATROPHY: ICD-10-CM

## 2020-07-16 DIAGNOSIS — N90.89 VULVAR IRRITATION: ICD-10-CM

## 2020-07-16 DIAGNOSIS — Z01.419 ENCOUNTER FOR GYNECOLOGICAL EXAMINATION WITHOUT ABNORMAL FINDING: Primary | ICD-10-CM

## 2020-07-16 PROCEDURE — 3074F SYST BP LT 130 MM HG: CPT | Performed by: OBSTETRICS & GYNECOLOGY

## 2020-07-16 PROCEDURE — 99386 PREV VISIT NEW AGE 40-64: CPT | Performed by: OBSTETRICS & GYNECOLOGY

## 2020-07-16 PROCEDURE — 3078F DIAST BP <80 MM HG: CPT | Performed by: OBSTETRICS & GYNECOLOGY

## 2020-07-16 RX ORDER — ESTRADIOL 0.1 MG/G
2 CREAM VAGINAL
Qty: 1 TUBE | Refills: 5 | Status: SHIPPED | OUTPATIENT
Start: 2020-07-16 | End: 2021-07-08 | Stop reason: ALTCHOICE

## 2020-07-16 RX ORDER — ESTRADIOL 10 UG/1
INSERT VAGINAL
COMMUNITY
End: 2020-07-16

## 2020-07-16 NOTE — PROGRESS NOTES
Well Woman Exam    HPI:  The patient is a 63yo female who presents today for annual exam. She is doing well. She has not had pap smear in 2-3 years. She has had an abnormal in about 2005.  Pt complains of vaginal atrophy for which she is on vaginal estrogen No      Sexual activity: Not on file    Lifestyle      Physical activity:        Days per week: Not on file        Minutes per session: Not on file      Stress: Not on file    Relationships      Social connections:        Talks on phone: Not on file incontinence  Heme: Denies easy bruising   Skin/Breast:  Denies any breast pain, lumps, or discharge.    Neurological:  denies headaches, blurred or double vision   Psychiatric: denies depression or anxiety       07/16/20  1331   BP: 100/64   Pulse: 64

## 2020-07-17 LAB — HPV I/H RISK 1 DNA SPEC QL NAA+PROBE: NEGATIVE

## 2020-07-19 LAB
GENITAL VAGINOSIS SCREEN: NEGATIVE
TRICHOMONAS SCREEN: NEGATIVE

## 2020-07-22 ENCOUNTER — HOSPITAL ENCOUNTER (EMERGENCY)
Facility: HOSPITAL | Age: 60
Discharge: HOME | End: 2020-07-22
Payer: COMMERCIAL

## 2020-07-22 VITALS
DIASTOLIC BLOOD PRESSURE: 77 MMHG | HEART RATE: 71 BPM | OXYGEN SATURATION: 99 % | TEMPERATURE: 99 F | RESPIRATION RATE: 16 BRPM | SYSTOLIC BLOOD PRESSURE: 118 MMHG

## 2020-07-22 DIAGNOSIS — U07.1 COVID-19: Primary | ICD-10-CM

## 2020-07-22 LAB — SARS-COV-2 RNA RESP QL NAA+PROBE: DETECTED

## 2020-07-22 PROCEDURE — 93010 ELECTROCARDIOGRAM REPORT: CPT | Performed by: INTERNAL MEDICINE

## 2020-07-22 PROCEDURE — 99283 EMERGENCY DEPT VISIT LOW MDM: CPT

## 2020-07-22 PROCEDURE — 93005 ELECTROCARDIOGRAM TRACING: CPT

## 2020-07-22 NOTE — ED INITIAL ASSESSMENT (HPI)
Patient presents with cough for a few days. Patient denies fever. Patient's  positive with Covid-19.

## 2020-07-31 NOTE — PROGRESS NOTES
It was good to see you in the office. Your most recent Pap Smear and HPV were negative. Please make sure you call to make an appointment for an annual exam in one year. If you have any questions or concerns please do not hesitate to contact the office.  Hop

## 2021-04-21 ENCOUNTER — TELEPHONE (OUTPATIENT)
Dept: INTERNAL MEDICINE CLINIC | Facility: CLINIC | Age: 61
End: 2021-04-21

## 2021-04-21 DIAGNOSIS — Z12.31 BREAST CANCER SCREENING BY MAMMOGRAM: Primary | ICD-10-CM

## 2021-04-21 NOTE — TELEPHONE ENCOUNTER
I'm sorry. This is a new patient and I cannot write orders until I see her. She could probably get the order from one of the gynecologists in Dr. Rick Brown office.

## 2021-04-21 NOTE — TELEPHONE ENCOUNTER
Patient is requesting an order for an Mammogram.  Please send message to Simfinit when approved. Patient has schedule an appointment with Dr. Tamela Desir on 7/8/21.

## 2021-04-22 NOTE — TELEPHONE ENCOUNTER
Spoke to patient and stated to her that since she's a new patient we can't order a mammogram for her until she's seen. I suggested she call 's office and she said  is no longer available.  She will wait until she sees

## 2021-07-08 ENCOUNTER — OFFICE VISIT (OUTPATIENT)
Dept: INTERNAL MEDICINE CLINIC | Facility: CLINIC | Age: 61
End: 2021-07-08
Payer: COMMERCIAL

## 2021-07-08 VITALS
WEIGHT: 143 LBS | HEIGHT: 66 IN | HEART RATE: 69 BPM | DIASTOLIC BLOOD PRESSURE: 63 MMHG | BODY MASS INDEX: 22.98 KG/M2 | SYSTOLIC BLOOD PRESSURE: 101 MMHG

## 2021-07-08 DIAGNOSIS — F32.A MILD DEPRESSION: ICD-10-CM

## 2021-07-08 DIAGNOSIS — Z00.00 ROUTINE HEALTH MAINTENANCE: Primary | ICD-10-CM

## 2021-07-08 DIAGNOSIS — Z12.31 BREAST CANCER SCREENING BY MAMMOGRAM: ICD-10-CM

## 2021-07-08 DIAGNOSIS — M67.471 GANGLION CYST OF RIGHT FOOT: ICD-10-CM

## 2021-07-08 DIAGNOSIS — D56.3 THALASSEMIA TRAIT: ICD-10-CM

## 2021-07-08 PROBLEM — R68.2 DRY MOUTH, UNSPECIFIED: Status: RESOLVED | Noted: 2018-09-10 | Resolved: 2021-07-08

## 2021-07-08 PROCEDURE — 3074F SYST BP LT 130 MM HG: CPT | Performed by: INTERNAL MEDICINE

## 2021-07-08 PROCEDURE — 3008F BODY MASS INDEX DOCD: CPT | Performed by: INTERNAL MEDICINE

## 2021-07-08 PROCEDURE — 99386 PREV VISIT NEW AGE 40-64: CPT | Performed by: INTERNAL MEDICINE

## 2021-07-08 PROCEDURE — 99213 OFFICE O/P EST LOW 20 MIN: CPT | Performed by: INTERNAL MEDICINE

## 2021-07-08 PROCEDURE — 3078F DIAST BP <80 MM HG: CPT | Performed by: INTERNAL MEDICINE

## 2021-07-08 NOTE — PROGRESS NOTES
HPI:    Patient ID: Maggie Steinberg is a 63year old female. HPI:  New pt. She would like a physical.  Pt c/o frequent yeast infections after intercourse. She saw the gynecologist last year and was advised to use coconut oil which has helped.   She has Negative for dysuria and hematuria. Musculoskeletal: Negative for arthralgias. Skin: Negative for rash. Lump right foot   Allergic/Immunologic: Negative for environmental allergies and food allergies. Neurological: Negative for headaches.    Ps Uterus: Not tender. Adnexa:         Left: No tenderness. Rectum: Normal. No mass or tenderness. Normal anal tone. Comments: Ovaries not palpable. Musculoskeletal:         General: Normal range of motion.       Cervical back: Normal ra

## 2021-07-08 NOTE — PATIENT INSTRUCTIONS
[address]  [phone]    Do fasting labs soon. Fast for 12 hours. Water is okay. You can walk-in or schedule an appointment.   Do not take vitamins or supplements for 3 days prior to the blood test.

## 2021-07-22 ENCOUNTER — LAB ENCOUNTER (OUTPATIENT)
Dept: LAB | Facility: HOSPITAL | Age: 61
End: 2021-07-22
Attending: INTERNAL MEDICINE
Payer: COMMERCIAL

## 2021-07-22 DIAGNOSIS — Z00.00 ROUTINE HEALTH MAINTENANCE: ICD-10-CM

## 2021-07-22 LAB
ALBUMIN SERPL-MCNC: 3.8 G/DL (ref 3.4–5)
ALBUMIN/GLOB SERPL: 1 {RATIO} (ref 1–2)
ALP LIVER SERPL-CCNC: 105 U/L
ALT SERPL-CCNC: 24 U/L
ANION GAP SERPL CALC-SCNC: 4 MMOL/L (ref 0–18)
AST SERPL-CCNC: 20 U/L (ref 15–37)
BASOPHILS # BLD AUTO: 0.03 X10(3) UL (ref 0–0.2)
BASOPHILS NFR BLD AUTO: 0.6 %
BILIRUB SERPL-MCNC: 0.5 MG/DL (ref 0.1–2)
BUN BLD-MCNC: 18 MG/DL (ref 7–18)
BUN/CREAT SERPL: 20.9 (ref 10–20)
CALCIUM BLD-MCNC: 9.4 MG/DL (ref 8.5–10.1)
CHLORIDE SERPL-SCNC: 109 MMOL/L (ref 98–112)
CHOLEST SMN-MCNC: 254 MG/DL (ref ?–200)
CO2 SERPL-SCNC: 29 MMOL/L (ref 21–32)
CREAT BLD-MCNC: 0.86 MG/DL
DEPRECATED RDW RBC AUTO: 41.6 FL (ref 35.1–46.3)
EOSINOPHIL # BLD AUTO: 0.27 X10(3) UL (ref 0–0.7)
EOSINOPHIL NFR BLD AUTO: 5.1 %
ERYTHROCYTE [DISTWIDTH] IN BLOOD BY AUTOMATED COUNT: 16.2 % (ref 11–15)
GLOBULIN PLAS-MCNC: 3.9 G/DL (ref 2.8–4.4)
GLUCOSE BLD-MCNC: 84 MG/DL (ref 70–99)
HCT VFR BLD AUTO: 35.4 %
HDLC SERPL-MCNC: 113 MG/DL (ref 40–59)
HGB BLD-MCNC: 10.8 G/DL
IMM GRANULOCYTES # BLD AUTO: 0.01 X10(3) UL (ref 0–1)
IMM GRANULOCYTES NFR BLD: 0.2 %
LDLC SERPL CALC-MCNC: 132 MG/DL (ref ?–100)
LYMPHOCYTES # BLD AUTO: 1.6 X10(3) UL (ref 1–4)
LYMPHOCYTES NFR BLD AUTO: 30.3 %
M PROTEIN MFR SERPL ELPH: 7.7 G/DL (ref 6.4–8.2)
MCH RBC QN AUTO: 21.8 PG (ref 26–34)
MCHC RBC AUTO-ENTMCNC: 30.5 G/DL (ref 31–37)
MCV RBC AUTO: 71.4 FL
MONOCYTES # BLD AUTO: 0.55 X10(3) UL (ref 0.1–1)
MONOCYTES NFR BLD AUTO: 10.4 %
NEUTROPHILS # BLD AUTO: 2.82 X10 (3) UL (ref 1.5–7.7)
NEUTROPHILS # BLD AUTO: 2.82 X10(3) UL (ref 1.5–7.7)
NEUTROPHILS NFR BLD AUTO: 53.4 %
NONHDLC SERPL-MCNC: 141 MG/DL (ref ?–130)
OSMOLALITY SERPL CALC.SUM OF ELEC: 295 MOSM/KG (ref 275–295)
PATIENT FASTING Y/N/NP: YES
PATIENT FASTING Y/N/NP: YES
PLATELET # BLD AUTO: 217 10(3)UL (ref 150–450)
POTASSIUM SERPL-SCNC: 4.5 MMOL/L (ref 3.5–5.1)
RBC # BLD AUTO: 4.96 X10(6)UL
SODIUM SERPL-SCNC: 142 MMOL/L (ref 136–145)
TRIGL SERPL-MCNC: 54 MG/DL (ref 30–149)
TSI SER-ACNC: 1.82 MIU/ML (ref 0.36–3.74)
VLDLC SERPL CALC-MCNC: 10 MG/DL (ref 0–30)
WBC # BLD AUTO: 5.3 X10(3) UL (ref 4–11)

## 2021-07-22 PROCEDURE — 80053 COMPREHEN METABOLIC PANEL: CPT

## 2021-07-22 PROCEDURE — 84443 ASSAY THYROID STIM HORMONE: CPT

## 2021-07-22 PROCEDURE — 85025 COMPLETE CBC W/AUTO DIFF WBC: CPT

## 2021-07-22 PROCEDURE — 36415 COLL VENOUS BLD VENIPUNCTURE: CPT

## 2021-07-22 PROCEDURE — 80061 LIPID PANEL: CPT

## 2021-10-19 ENCOUNTER — TELEPHONE (OUTPATIENT)
Dept: INTERNAL MEDICINE CLINIC | Facility: CLINIC | Age: 61
End: 2021-10-19

## 2021-10-19 NOTE — TELEPHONE ENCOUNTER
Patient is requesting a order for a xray of her right foot. States she has a bone or a knot protruding that she mentioned to Dr. Murray Mitchell and its giving her problems. She said it s the same foot she had fractured 3 times now. Please call back to inform when order is placed.

## 2021-10-20 ENCOUNTER — TELEMEDICINE (OUTPATIENT)
Dept: INTERNAL MEDICINE CLINIC | Facility: CLINIC | Age: 61
End: 2021-10-20

## 2021-10-20 DIAGNOSIS — F32.A MILD DEPRESSION: ICD-10-CM

## 2021-10-20 DIAGNOSIS — E78.00 HYPERCHOLESTEROLEMIA: ICD-10-CM

## 2021-10-20 DIAGNOSIS — M79.671 RIGHT FOOT PAIN: Primary | ICD-10-CM

## 2021-10-20 PROCEDURE — 99213 OFFICE O/P EST LOW 20 MIN: CPT | Performed by: INTERNAL MEDICINE

## 2021-10-20 NOTE — ASSESSMENT & PLAN NOTE
I reviewed the patient's cholesterol with her. She has high total and LDL cholesterol, but she also has a very high HDL. I suggested that she could do a CT heart. She will think about this.

## 2021-10-20 NOTE — PROGRESS NOTES
This visit is conducted using Telemedicine with live, interactive video and audio. Patient has been referred to the [facility] website at [email or web address] to review the yearly Consent to Treat document.     Patient understands and accepts financial res .There were no vitals taken for this visit. PHYSICAL EXAM:   Physical Exam  Constitutional:       General: She is not in acute distress. Appearance: Normal appearance. HENT:      Head: Normocephalic and atraumatic.    Eyes:      Conjunctiva/sc

## 2021-10-20 NOTE — ASSESSMENT & PLAN NOTE
Patient complains of foot pain. We will check an x-ray and refer her to the podiatrist.  Srinivasa Vicente as needed.

## 2021-10-20 NOTE — PATIENT INSTRUCTIONS
Take Aleve (naproxen), 1 tab twice daily, unless it bothers your stomach. I recommend that you buy over-the-counter Vitamin D and take 1000 units daily.

## 2021-10-23 ENCOUNTER — HOSPITAL ENCOUNTER (OUTPATIENT)
Dept: GENERAL RADIOLOGY | Facility: HOSPITAL | Age: 61
Discharge: HOME | End: 2021-10-23
Attending: INTERNAL MEDICINE
Payer: COMMERCIAL

## 2021-10-23 ENCOUNTER — HOSPITAL ENCOUNTER (OUTPATIENT)
Dept: MAMMOGRAPHY | Facility: HOSPITAL | Age: 61
Discharge: HOME | End: 2021-10-23
Attending: INTERNAL MEDICINE
Payer: COMMERCIAL

## 2021-10-23 DIAGNOSIS — Z12.31 BREAST CANCER SCREENING BY MAMMOGRAM: ICD-10-CM

## 2021-10-23 DIAGNOSIS — M79.671 RIGHT FOOT PAIN: ICD-10-CM

## 2021-10-23 PROCEDURE — 77063 BREAST TOMOSYNTHESIS BI: CPT | Performed by: INTERNAL MEDICINE

## 2021-10-23 PROCEDURE — 73630 X-RAY EXAM OF FOOT: CPT | Performed by: INTERNAL MEDICINE

## 2021-10-23 PROCEDURE — 77067 SCR MAMMO BI INCL CAD: CPT | Performed by: INTERNAL MEDICINE

## 2021-10-27 ENCOUNTER — PATIENT MESSAGE (OUTPATIENT)
Dept: INTERNAL MEDICINE CLINIC | Facility: CLINIC | Age: 61
End: 2021-10-27

## 2021-11-09 ENCOUNTER — OFFICE VISIT (OUTPATIENT)
Dept: PODIATRY CLINIC | Facility: CLINIC | Age: 61
End: 2021-11-09
Payer: COMMERCIAL

## 2021-11-09 VITALS — WEIGHT: 135 LBS | BODY MASS INDEX: 21.69 KG/M2 | HEIGHT: 66 IN

## 2021-11-09 DIAGNOSIS — M21.6X1 ACQUIRED EQUINUS DEFORMITY OF RIGHT FOOT: ICD-10-CM

## 2021-11-09 DIAGNOSIS — M76.61 ACHILLES TENDINITIS OF RIGHT LOWER EXTREMITY: Primary | ICD-10-CM

## 2021-11-09 DIAGNOSIS — M77.31 HEEL SPUR, RIGHT: ICD-10-CM

## 2021-11-09 PROCEDURE — L3485 SHOE HEEL PAD REMOVABLE FOR: HCPCS | Performed by: PODIATRIST

## 2021-11-09 PROCEDURE — 99203 OFFICE O/P NEW LOW 30 MIN: CPT | Performed by: PODIATRIST

## 2021-11-09 PROCEDURE — 3008F BODY MASS INDEX DOCD: CPT | Performed by: PODIATRIST

## 2021-11-09 NOTE — PROGRESS NOTES
[facility], [facility] Podiatry  Progress Note    Emani Nur is a 63year old female.  Patient presents with:  Consult: patient presents with 2 bone spurs on the right heel ,pain reaches a 7-8/10 in the PM         HPI:     This is a pleasant female that presen Well-developed and well-nourished. Gait appears normal. No apparent distress. Alert and oriented to person, place, and time. Integument: There are no varicosities. Skin appears moist, warm, and supple with positive hair growth.      Right hallux toenail is edema/swelling. Discussed potential need to order MRI if conservative management fails to resolve symptoms out of concern for underlying tear. Discussed potential need for surgical intervention if conservative management fails to resolve symptoms.

## 2021-11-11 ENCOUNTER — OFFICE VISIT (OUTPATIENT)
Dept: INTERNAL MEDICINE CLINIC | Facility: CLINIC | Age: 61
End: 2021-11-11
Payer: COMMERCIAL

## 2021-11-11 ENCOUNTER — LAB ENCOUNTER (OUTPATIENT)
Dept: LAB | Facility: HOSPITAL | Age: 61
End: 2021-11-11
Attending: INTERNAL MEDICINE
Payer: COMMERCIAL

## 2021-11-11 VITALS
HEIGHT: 66 IN | RESPIRATION RATE: 16 BRPM | BODY MASS INDEX: 23.3 KG/M2 | SYSTOLIC BLOOD PRESSURE: 101 MMHG | TEMPERATURE: 96 F | HEART RATE: 87 BPM | DIASTOLIC BLOOD PRESSURE: 68 MMHG | WEIGHT: 145 LBS

## 2021-11-11 DIAGNOSIS — Z01.84 IMMUNITY STATUS TESTING: ICD-10-CM

## 2021-11-11 DIAGNOSIS — F32.A MILD DEPRESSION: ICD-10-CM

## 2021-11-11 DIAGNOSIS — E78.5 DYSLIPIDEMIA: Primary | ICD-10-CM

## 2021-11-11 DIAGNOSIS — M79.671 RIGHT FOOT PAIN: ICD-10-CM

## 2021-11-11 PROCEDURE — 86787 VARICELLA-ZOSTER ANTIBODY: CPT

## 2021-11-11 PROCEDURE — 3078F DIAST BP <80 MM HG: CPT | Performed by: INTERNAL MEDICINE

## 2021-11-11 PROCEDURE — 3008F BODY MASS INDEX DOCD: CPT | Performed by: INTERNAL MEDICINE

## 2021-11-11 PROCEDURE — 99213 OFFICE O/P EST LOW 20 MIN: CPT | Performed by: INTERNAL MEDICINE

## 2021-11-11 PROCEDURE — 3074F SYST BP LT 130 MM HG: CPT | Performed by: INTERNAL MEDICINE

## 2021-11-11 PROCEDURE — 36415 COLL VENOUS BLD VENIPUNCTURE: CPT

## 2021-11-11 NOTE — ASSESSMENT & PLAN NOTE
Patient is not sure if she had chickenpox. We will check her titer and if negative, she will need the chickenpox vaccine. If positive, she will need the shingles vaccine.

## 2021-11-11 NOTE — PATIENT INSTRUCTIONS
If your test showed that you had chickenpox, please schedule a nurse visit for the Shingles vaccine.

## 2021-11-11 NOTE — ASSESSMENT & PLAN NOTE
The patient has a very high HDL, high total cholesterol, and a high LDL. Since her HDL is above 100, this can be indicative of a nonprotective HDL. She will consider having a CT of her heart for further risk stratification.

## 2021-11-11 NOTE — PROGRESS NOTES
HPI:    Patient ID: Juani De León is a 63year old female. HPI:  Pt saw the podiatrist and he said her right foot pain and lump was due to a spur. He gave her heel pads, rx'd PT and will f/u in 1 month.       Past Surgical History:   Procedure Sadaf Caicedoress heard.      Pulmonary:      Effort: Pulmonary effort is normal. No respiratory distress. Breath sounds: Normal breath sounds. No wheezing or rales.                  ASSESSMENT/PLAN:     Problem List Items Addressed This Visit        High    Dyslipidemi

## 2021-12-22 ENCOUNTER — MED REC SCAN ONLY (OUTPATIENT)
Dept: INTERNAL MEDICINE CLINIC | Facility: CLINIC | Age: 61
End: 2021-12-22

## 2021-12-31 ENCOUNTER — PATIENT MESSAGE (OUTPATIENT)
Dept: INTERNAL MEDICINE CLINIC | Facility: CLINIC | Age: 61
End: 2021-12-31

## 2021-12-31 NOTE — TELEPHONE ENCOUNTER
From: Chantel Aquino  To: Ila Harris MD  Sent: 12/31/2021 6:43 AM CST  Subject: Question regarding CT Scan Chest    Good morning, so now how can I get my cholesterol down, do I need to eat better and exercise?

## 2022-06-19 ENCOUNTER — HOSPITAL ENCOUNTER (EMERGENCY)
Facility: HOSPITAL | Age: 62
Discharge: HOME | End: 2022-06-20
Attending: EMERGENCY MEDICINE
Payer: COMMERCIAL

## 2022-06-19 DIAGNOSIS — S06.0X0A CONCUSSION WITHOUT LOSS OF CONSCIOUSNESS, INITIAL ENCOUNTER: Primary | ICD-10-CM

## 2022-06-19 PROCEDURE — 99284 EMERGENCY DEPT VISIT MOD MDM: CPT

## 2022-06-20 ENCOUNTER — APPOINTMENT (OUTPATIENT)
Dept: CT IMAGING | Facility: HOSPITAL | Age: 62
End: 2022-06-20
Attending: EMERGENCY MEDICINE
Payer: COMMERCIAL

## 2022-06-20 VITALS
HEIGHT: 66 IN | RESPIRATION RATE: 16 BRPM | WEIGHT: 140 LBS | SYSTOLIC BLOOD PRESSURE: 102 MMHG | HEART RATE: 72 BPM | TEMPERATURE: 98 F | BODY MASS INDEX: 22.5 KG/M2 | DIASTOLIC BLOOD PRESSURE: 59 MMHG | OXYGEN SATURATION: 98 %

## 2022-06-20 PROCEDURE — 70450 CT HEAD/BRAIN W/O DYE: CPT | Performed by: EMERGENCY MEDICINE

## 2022-06-20 NOTE — ED INITIAL ASSESSMENT (HPI)
Tripped and hit posterior head on grass around 1900. Took Excedrin without relief. No blood thinners. Denies neck pain.

## 2022-08-11 ENCOUNTER — LAB ENCOUNTER (OUTPATIENT)
Dept: LAB | Facility: HOSPITAL | Age: 62
End: 2022-08-11
Attending: INTERNAL MEDICINE
Payer: COMMERCIAL

## 2022-08-11 ENCOUNTER — OFFICE VISIT (OUTPATIENT)
Dept: INTERNAL MEDICINE CLINIC | Facility: CLINIC | Age: 62
End: 2022-08-11
Payer: COMMERCIAL

## 2022-08-11 VITALS
SYSTOLIC BLOOD PRESSURE: 95 MMHG | DIASTOLIC BLOOD PRESSURE: 59 MMHG | HEART RATE: 76 BPM | WEIGHT: 136.31 LBS | HEIGHT: 66 IN | BODY MASS INDEX: 21.91 KG/M2

## 2022-08-11 DIAGNOSIS — Z00.00 ROUTINE HEALTH MAINTENANCE: ICD-10-CM

## 2022-08-11 DIAGNOSIS — Z00.00 ROUTINE HEALTH MAINTENANCE: Primary | ICD-10-CM

## 2022-08-11 DIAGNOSIS — D56.3 THALASSEMIA TRAIT: ICD-10-CM

## 2022-08-11 DIAGNOSIS — Z12.31 BREAST CANCER SCREENING BY MAMMOGRAM: ICD-10-CM

## 2022-08-11 DIAGNOSIS — F32.A MILD DEPRESSION: ICD-10-CM

## 2022-08-11 PROBLEM — Z01.84 IMMUNITY STATUS TESTING: Status: RESOLVED | Noted: 2021-07-08 | Resolved: 2022-08-11

## 2022-08-11 PROBLEM — E78.5 DYSLIPIDEMIA: Status: RESOLVED | Noted: 2021-11-11 | Resolved: 2022-08-11

## 2022-08-11 LAB
ALBUMIN SERPL-MCNC: 3.8 G/DL (ref 3.4–5)
ALBUMIN/GLOB SERPL: 1 {RATIO} (ref 1–2)
ALP LIVER SERPL-CCNC: 103 U/L
ALT SERPL-CCNC: 19 U/L
ANION GAP SERPL CALC-SCNC: 5 MMOL/L (ref 0–18)
AST SERPL-CCNC: 17 U/L (ref 15–37)
BASOPHILS # BLD AUTO: 0.03 X10(3) UL (ref 0–0.2)
BASOPHILS NFR BLD AUTO: 0.5 %
BILIRUB SERPL-MCNC: 0.3 MG/DL (ref 0.1–2)
BUN BLD-MCNC: 17 MG/DL (ref 7–18)
BUN/CREAT SERPL: 17.3 (ref 10–20)
CALCIUM BLD-MCNC: 9.4 MG/DL (ref 8.5–10.1)
CHLORIDE SERPL-SCNC: 108 MMOL/L (ref 98–112)
CHOLEST SERPL-MCNC: 225 MG/DL (ref ?–200)
CO2 SERPL-SCNC: 29 MMOL/L (ref 21–32)
CREAT BLD-MCNC: 0.98 MG/DL
DEPRECATED RDW RBC AUTO: 42.2 FL (ref 35.1–46.3)
EOSINOPHIL # BLD AUTO: 0.21 X10(3) UL (ref 0–0.7)
EOSINOPHIL NFR BLD AUTO: 3.7 %
ERYTHROCYTE [DISTWIDTH] IN BLOOD BY AUTOMATED COUNT: 16.5 % (ref 11–15)
FASTING PATIENT LIPID ANSWER: YES
FASTING STATUS PATIENT QL REPORTED: YES
GFR SERPLBLD BASED ON 1.73 SQ M-ARVRAT: 66 ML/MIN/1.73M2 (ref 60–?)
GLOBULIN PLAS-MCNC: 3.7 G/DL (ref 2.8–4.4)
GLUCOSE BLD-MCNC: 88 MG/DL (ref 70–99)
HCT VFR BLD AUTO: 33.4 %
HDLC SERPL-MCNC: 99 MG/DL (ref 40–59)
HGB BLD-MCNC: 10.2 G/DL
IMM GRANULOCYTES # BLD AUTO: 0.01 X10(3) UL (ref 0–1)
IMM GRANULOCYTES NFR BLD: 0.2 %
LDLC SERPL CALC-MCNC: 118 MG/DL (ref ?–100)
LYMPHOCYTES # BLD AUTO: 1.57 X10(3) UL (ref 1–4)
LYMPHOCYTES NFR BLD AUTO: 27.9 %
MCH RBC QN AUTO: 21.9 PG (ref 26–34)
MCHC RBC AUTO-ENTMCNC: 30.5 G/DL (ref 31–37)
MCV RBC AUTO: 71.8 FL
MONOCYTES # BLD AUTO: 0.51 X10(3) UL (ref 0.1–1)
MONOCYTES NFR BLD AUTO: 9.1 %
NEUTROPHILS # BLD AUTO: 3.3 X10 (3) UL (ref 1.5–7.7)
NEUTROPHILS # BLD AUTO: 3.3 X10(3) UL (ref 1.5–7.7)
NEUTROPHILS NFR BLD AUTO: 58.6 %
NONHDLC SERPL-MCNC: 126 MG/DL (ref ?–130)
OSMOLALITY SERPL CALC.SUM OF ELEC: 295 MOSM/KG (ref 275–295)
PLATELET # BLD AUTO: 216 10(3)UL (ref 150–450)
POTASSIUM SERPL-SCNC: 4.4 MMOL/L (ref 3.5–5.1)
PROT SERPL-MCNC: 7.5 G/DL (ref 6.4–8.2)
RBC # BLD AUTO: 4.65 X10(6)UL
SODIUM SERPL-SCNC: 142 MMOL/L (ref 136–145)
TRIGL SERPL-MCNC: 45 MG/DL (ref 30–149)
TSI SER-ACNC: 1.04 MIU/ML (ref 0.36–3.74)
VLDLC SERPL CALC-MCNC: 8 MG/DL (ref 0–30)
WBC # BLD AUTO: 5.6 X10(3) UL (ref 4–11)

## 2022-08-11 PROCEDURE — 80053 COMPREHEN METABOLIC PANEL: CPT

## 2022-08-11 PROCEDURE — 80061 LIPID PANEL: CPT

## 2022-08-11 PROCEDURE — 99396 PREV VISIT EST AGE 40-64: CPT | Performed by: INTERNAL MEDICINE

## 2022-08-11 PROCEDURE — 84443 ASSAY THYROID STIM HORMONE: CPT

## 2022-08-11 PROCEDURE — 3074F SYST BP LT 130 MM HG: CPT | Performed by: INTERNAL MEDICINE

## 2022-08-11 PROCEDURE — 3078F DIAST BP <80 MM HG: CPT | Performed by: INTERNAL MEDICINE

## 2022-08-11 PROCEDURE — 36415 COLL VENOUS BLD VENIPUNCTURE: CPT

## 2022-08-11 PROCEDURE — 85025 COMPLETE CBC W/AUTO DIFF WBC: CPT

## 2022-08-11 PROCEDURE — 3008F BODY MASS INDEX DOCD: CPT | Performed by: INTERNAL MEDICINE

## 2022-08-11 NOTE — PATIENT INSTRUCTIONS
I recommend that you buy over-the-counter Vitamin D and take 1000 units daily. I recommend that you take calcium carbonate with vitamin D, a 600 mg tab once a day with food. If the calcium supplements are too large to swallow, chewable calcium supplements are available. You should also do weight bearing exercise such as walking, 30 min 5 times per week.

## 2022-11-09 ENCOUNTER — HOSPITAL ENCOUNTER (OUTPATIENT)
Dept: MAMMOGRAPHY | Facility: HOSPITAL | Age: 62
Discharge: HOME | End: 2022-11-09
Attending: INTERNAL MEDICINE
Payer: COMMERCIAL

## 2022-11-09 DIAGNOSIS — Z12.31 BREAST CANCER SCREENING BY MAMMOGRAM: ICD-10-CM

## 2022-11-09 PROCEDURE — 77067 SCR MAMMO BI INCL CAD: CPT | Performed by: INTERNAL MEDICINE

## 2022-11-09 PROCEDURE — 77063 BREAST TOMOSYNTHESIS BI: CPT | Performed by: INTERNAL MEDICINE

## 2022-11-29 NOTE — ASSESSMENT & PLAN NOTE
Hello,    I wanted to follow up to see if the patient wants to get this medication though [facility] (per below), if you would like the preservice team to try to get  assistance, or if you would like to switch to a different medication.    Thank you,  Pamella M Pat  Clinical Pharmacy Liaison  11/29/2022       ----- Message -----  From: KENNY Solis  Sent: 11/14/2022   8:34 AM CST  To: Josue Maurer RN  Subject: RE: Xifaxan                                      Please call patient to see if she is willing to go through [facility] where she can obtain through [facility] 100 pills for about $178 without tax and shipping.   ----- Message -----  From: Josue Maurer RN  Sent: 11/11/2022   1:08 PM CST  To: KENNY Solis  Subject: FW: Xifaxan                                      Please see message form Nemesio Rao below.  ----- Message -----  From: Nemesio Rao  Sent: 11/11/2022  11:26 AM CST  To: [facility] Nurse Msg Pool  Subject: Xifaxan                                          Insurance does not require a PA, however; the co pay is $404.     Do you want to proceed with trying to get manufacture assistance or switching to a affordable medication?     Nemesio Rao   Mild anemia. Continue to monitor.

## 2024-07-01 ENCOUNTER — OFFICE VISIT (OUTPATIENT)
Dept: INTERNAL MEDICINE CLINIC | Facility: CLINIC | Age: 64
End: 2024-07-01
Payer: COMMERCIAL

## 2024-07-01 VITALS
BODY MASS INDEX: 23.43 KG/M2 | WEIGHT: 145.81 LBS | HEIGHT: 66 IN | SYSTOLIC BLOOD PRESSURE: 94 MMHG | DIASTOLIC BLOOD PRESSURE: 56 MMHG | OXYGEN SATURATION: 100 % | HEART RATE: 65 BPM

## 2024-07-01 DIAGNOSIS — E55.9 VITAMIN D DEFICIENCY: ICD-10-CM

## 2024-07-01 DIAGNOSIS — D56.3 THALASSEMIA TRAIT: ICD-10-CM

## 2024-07-01 DIAGNOSIS — F32.A MILD DEPRESSION: ICD-10-CM

## 2024-07-01 DIAGNOSIS — E78.00 HYPERCHOLESTEROLEMIA: ICD-10-CM

## 2024-07-01 DIAGNOSIS — D50.9 IRON DEFICIENCY ANEMIA, UNSPECIFIED IRON DEFICIENCY ANEMIA TYPE: ICD-10-CM

## 2024-07-01 DIAGNOSIS — Z00.00 ANNUAL PHYSICAL EXAM: ICD-10-CM

## 2024-07-01 DIAGNOSIS — Z12.31 BREAST CANCER SCREENING BY MAMMOGRAM: Primary | ICD-10-CM

## 2024-07-01 DIAGNOSIS — Z12.11 SCREEN FOR COLON CANCER: ICD-10-CM

## 2024-07-01 DIAGNOSIS — J45.20 MILD INTERMITTENT ASTHMA WITHOUT COMPLICATION (HCC): ICD-10-CM

## 2024-07-01 PROBLEM — R22.30 NODULE OF FINGER: Status: ACTIVE | Noted: 2019-12-04

## 2024-07-01 PROBLEM — M25.562 PAIN IN LEFT KNEE: Status: ACTIVE | Noted: 2017-02-19

## 2024-07-01 PROBLEM — R71.8 MICROCYTOSIS: Status: ACTIVE | Noted: 2020-03-07

## 2024-07-01 PROBLEM — R73.01 IMPAIRED FASTING GLUCOSE: Status: ACTIVE | Noted: 2018-06-08

## 2024-07-01 PROBLEM — J45.909 ASTHMA (HCC): Status: ACTIVE | Noted: 2024-07-01

## 2024-07-01 PROBLEM — D64.9 ANEMIA: Status: ACTIVE | Noted: 2017-02-19

## 2024-07-01 PROBLEM — H61.21 IMPACTED CERUMEN OF RIGHT EAR: Status: ACTIVE | Noted: 2020-01-04

## 2024-07-01 PROBLEM — K21.9 LARYNGOPHARYNGEAL REFLUX: Status: ACTIVE | Noted: 2018-11-18

## 2024-07-01 PROBLEM — H93.A9 SUBJECTIVE PULSATILE TINNITUS: Status: ACTIVE | Noted: 2020-03-07

## 2024-07-01 NOTE — PATIENT INSTRUCTIONS
Alpha thalassemias -- There are four alpha globin genes (two genes at the alpha globin locus inherited from each parent). Alpha thalassemia is caused by reduced production of alpha chains and accumulation of excess beta-like chains. The severity of phenotype increases with loss of one, two, three, or four functioning alpha globin alleles. Nondeletional variant alleles such as hemoglobin [place] (Hb CS) tend to cause more severe phenotypes than the deletional alleles (--/aaCS tends to be more severe than aa/a-) [7]. The reason for this is not well understood, but it is speculated that it might have to do with the biochemical instability of the alpha chain arising in reduced amounts from the abnormal allele, producing oxidative damage in early erythropoiesis.     Age of onset - Onset at or before birth suggests alpha thalassemia major; onset in infancy (6 to 12 months) suggests transfusion-dependent beta thalassemia. Diagnosis later in life suggests a milder form. (See 'Overview of subtypes and disease severity' above and 'Severity and age of onset' above.)  ?Severity of symptoms  History of gallstones suggests possible chronic hemolysis.  History of jaundice or dark urine suggests significant hemolysis.  Impaired growth or skeletal changes suggest significant extramedullary hematopoiesis.  ?Examination - Those with severe disease may also have evidence of hemolysis and extramedullary hematopoiesis such as jaundice, skeletal abnormalities, or splenomegaly.  The family or clinician may be contacted with positive results from prenatal testing or from a  screening test. The evaluation of these individuals is the same as for those suspected to have thalassemia based on other information. (See \"Methods for hemoglobin analysis and hemoglobinopathy testing\", section on 'Population screening (eg, routine  screen)'.)  Laboratory testing -- Initial testing includes a complete blood count (CBC), review of  the blood smear, and iron studies. Iron studies are required to evaluate for iron deficiency and iron overload. In appropriate patients, hemoglobin analysis and/or genetic testing is appropriate to confirm the diagnosis.  Bone marrow evaluation is not required, but, if performed, it will reveal hyperplasia that is unusual in the degree to which there is a preponderance of immature erythroblasts. There may be bizarre morphology of erythroid progenitors, with poorly hemoglobinized erythroblasts that have inclusion bodies similar to James bodies. There may be megaloblastic changes, especially if folate is deficient (due to increased requirements with hemolysis).

## 2024-07-01 NOTE — PROGRESS NOTES
Madina Carter is a 64 year old female who is here for  1. Breast cancer screening by mammogram    2. Screen for colon cancer    3. Annual physical exam    4. Mild depression    5. Vitamin D deficiency    6. Hypercholesterolemia    7. Mild intermittent asthma without complication (HCC)    8. Thalassemia trait    9. Iron deficiency anemia, unspecified iron deficiency anemia type      HPI:   Madina Carter is a 64 year old female  presents for a physical.     Adopted son 9 years old  2 biological daughters.   Lives in [place], but she comes here for the summer.    Past Medical History:    Anemia    awareness in her 20's; unsure about childhood    Anxiety    situational; with driving    Asthma (HCC)     Past Surgical History:   Procedure Laterality Date    Colonoscopy  2013    [facility]-normal    Colonoscopy  04/2019    Egd  04/2019    Egd N/A 4/22/2019    Procedure: ESOPHAGOGASTRODUODENOSCOPY, COLONOSCOPY, POSSIBLE BIOPSY, POSSIBLE POLYPECTOMY 53325, 49483;  Surgeon: Aneesh Peterson MD;  Location: [facility] [place] [facility] [place] [place]    Foot surgery Bilateral 1998    Tubal ligation  1995     No current outpatient medications on file.    Allergies:No Known Allergies  Social History     Socioeconomic History    Marital status:      Spouse name: Not on file    Number of children: Not on file    Years of education: Not on file    Highest education level: Not on file   Occupational History    Not on file   Tobacco Use    Smoking status: Never    Smokeless tobacco: Never   Substance and Sexual Activity    Alcohol use: No     Alcohol/week: 0.0 standard drinks of alcohol    Drug use: No    Sexual activity: Not on file   Other Topics Concern    Not on file   Social History Narrative    Patient is remarried; after 6 yrs to her spouse, George. She lives with her , adult daughter, and 1 yo foster child, boy. Madina works as a manicurist and . Madina also has an adult daughter in college. Madina lives in  [place], [place].      Social Determinants of Health     Financial Resource Strain: Low Risk  (11/16/2023)    Received from [facility]    Overall Financial Resource Strain (CARDIA)     Difficulty of Paying Living Expenses: Not hard at all   Food Insecurity: No Food Insecurity (11/16/2023)    Received from [facility]    Hunger Vital Sign     Worried About Running Out of Food in the Last Year: Never true     Ran Out of Food in the Last Year: Never true   Transportation Needs: No Transportation Needs (11/16/2023)    Received from [facility]    PRAPARE - Transportation     Lack of Transportation (Medical): No     Lack of Transportation (Non-Medical): No   Physical Activity: Sufficiently Active (11/16/2023)    Received from [facility]    Exercise Vital Sign     Days of Exercise per Week: 5 days     Minutes of Exercise per Session: 30 min   Stress: No Stress Concern Present (11/16/2023)    Received from [facility]    [place] [employer] - Occupational Stress Questionnaire     Feeling of Stress : Not at all   Social Connections: Socially Integrated (11/16/2023)    Received from [facility]    Social Connection and Isolation Panel [NHANES]     Frequency of Communication with Friends and Family: More than three times a week     Frequency of Social Gatherings with Friends and Family: Once a week     Attends [employer] Services: More than 4 times per year     Active Member of Clubs or Organizations: Yes     Attends Club or Organization Meetings: More than 4 times per year     Marital Status:    Housing Stability: Low Risk  (11/16/2023)    Received from [facility]    Housing Stability Vital Sign     Unable to Pay for Housing in the Last Year: No     Number of Places Lived in the Last Year: 2     Unstable Housing in the Last Year: No       REVIEW OF SYSTEMS:     GENERAL  HEALTH: No fevers, chills, sweats, fatigue  VISION: No recent vision problems, blurry vision or double vision  HEENT: No decreased hearing ear pain nasal congestion or sore throat  SKIN: denies any unusual skin lesions or rashes  RESPIRATORY: denies shortness of breath, cough, wheezing  CARDIOVASCULAR: denies chest pain on exertion, palpitations, swelling in feet  GI: denies abdominal pain and denies heartburn, nausea or vomiting  : No Pain on urination, change in the color of urine, discharge, urinating frequently  MUS: No back pain, joint pain, muscle pain  NEURO: denies headaches , anxiety, depression    EXAM:     Vitals:    07/01/24 1149   BP: 94/56   Pulse: 65   SpO2: 100%   Weight: 145 lb 12.8 oz (66.1 kg)   Height: 5' 6\" (1.676 m)     GENERAL: well developed, well nourished,in no apparent distress  SKIN: no rashes,no suspicious lesions  HEENT: atraumatic, normocephalic,ears and throat are clear,   NECK: supple,no adenopathy,  LUNGS: clear to auscultation, no wheeze  CARDIO: RRR without murmur  GI: good BS's,no masses or tenderness  EXTREMITIES: no cyanosis, or edema    ASSESSMENT AND PLAN:     1. Breast cancer screening by mammogram  - [facility] ABDIAS 2D+3D SCREENING BILAT (CPT=77067/22665); Future    2. Screen for colon cancer  - [facility] - INTERNAL    3. Annual physical exam  - OBG Referral - In Network    4. Mild depression  -controlled    5. Vitamin D deficiency  - Vitamin D [E]; Future    6. Hypercholesterolemia  - CBC, Platelet; No Differential; Future  - Basic Metabolic Panel (8); Future  - TSH and Free T4; Future  - Lipid Panel; Future  - Vitamin D [E]; Future  - Iron And Tibc [E]; Future  - Ferritin [E]; Future    7. Mild intermittent asthma without complication (HCC)  -not using inhalers    8. Thalassemia trait  -Alpha thalassemia minor  Plan  - CBC, Platelet; No Differential; Future  - Basic Metabolic Panel (8); Future  - TSH and Free T4; Future  - Lipid Panel; Future  - Vitamin D [E]; Future  - Iron And  Tibc [E]; Future  - Ferritin [E]; Future    9. Iron deficiency anemia, unspecified iron deficiency anemia type  - CBC, Platelet; No Differential; Future  - Basic Metabolic Panel (8); Future  - TSH and Free T4; Future  - Lipid Panel; Future  - Vitamin D [E]; Future  - Iron And Tibc [E]; Future  - Ferritin [E]; Future        Health Maintenance   Topic Date Due    Asthma Action Plan  Never done    Pap Smear  07/16/2023    Mammogram  11/09/2023    Colorectal Cancer Screening  04/22/2024    Pneumococcal Vaccine: Birth to 64yrs (1 of 2 - PCV) 01/01/2025 (Originally 5/8/1966)    COVID-19 Vaccine (4 - 2023-24 season) 01/01/2025 (Originally 9/1/2023)    DTaP,Tdap,and Td Vaccines (2 - Td or Tdap) 07/31/2024    Influenza Vaccine (1) 10/01/2024    Annual Physical  07/01/2025    Asthma Control Test  07/01/2025    Annual Depression Screening  Completed    Zoster Vaccines  Completed           Kia Dobson MD  7/1/2024

## 2024-07-02 ENCOUNTER — LAB ENCOUNTER (OUTPATIENT)
Dept: LAB | Facility: HOSPITAL | Age: 64
End: 2024-07-02
Attending: INTERNAL MEDICINE
Payer: COMMERCIAL

## 2024-07-02 DIAGNOSIS — E55.9 VITAMIN D DEFICIENCY: ICD-10-CM

## 2024-07-02 DIAGNOSIS — D56.3 THALASSEMIA TRAIT: ICD-10-CM

## 2024-07-02 DIAGNOSIS — E78.00 HYPERCHOLESTEROLEMIA: ICD-10-CM

## 2024-07-02 DIAGNOSIS — D50.9 IRON DEFICIENCY ANEMIA, UNSPECIFIED IRON DEFICIENCY ANEMIA TYPE: ICD-10-CM

## 2024-07-02 LAB
ANION GAP SERPL CALC-SCNC: 4 MMOL/L (ref 0–18)
BUN BLD-MCNC: 17 MG/DL (ref 9–23)
BUN/CREAT SERPL: 16.7 (ref 10–20)
CALCIUM BLD-MCNC: 9.4 MG/DL (ref 8.7–10.4)
CHLORIDE SERPL-SCNC: 110 MMOL/L (ref 98–112)
CHOLEST SERPL-MCNC: 228 MG/DL (ref ?–200)
CO2 SERPL-SCNC: 28 MMOL/L (ref 21–32)
CREAT BLD-MCNC: 1.02 MG/DL
DEPRECATED HBV CORE AB SER IA-ACNC: 69.4 NG/ML
DEPRECATED RDW RBC AUTO: 41.9 FL (ref 35.1–46.3)
EGFRCR SERPLBLD CKD-EPI 2021: 62 ML/MIN/1.73M2 (ref 60–?)
ERYTHROCYTE [DISTWIDTH] IN BLOOD BY AUTOMATED COUNT: 16.4 % (ref 11–15)
FASTING PATIENT LIPID ANSWER: YES
FASTING STATUS PATIENT QL REPORTED: YES
GLUCOSE BLD-MCNC: 85 MG/DL (ref 70–99)
HCT VFR BLD AUTO: 33.8 %
HDLC SERPL-MCNC: 88 MG/DL (ref 40–59)
HGB BLD-MCNC: 10.8 G/DL
IRON SATN MFR SERPL: 18 %
IRON SERPL-MCNC: 63 UG/DL
LDLC SERPL CALC-MCNC: 127 MG/DL (ref ?–100)
MCH RBC QN AUTO: 22.5 PG (ref 26–34)
MCHC RBC AUTO-ENTMCNC: 32 G/DL (ref 31–37)
MCV RBC AUTO: 70.6 FL
NONHDLC SERPL-MCNC: 140 MG/DL (ref ?–130)
OSMOLALITY SERPL CALC.SUM OF ELEC: 295 MOSM/KG (ref 275–295)
PLATELET # BLD AUTO: 211 10(3)UL (ref 150–450)
POTASSIUM SERPL-SCNC: 4 MMOL/L (ref 3.5–5.1)
RBC # BLD AUTO: 4.79 X10(6)UL
SODIUM SERPL-SCNC: 142 MMOL/L (ref 136–145)
T4 FREE SERPL-MCNC: 1 NG/DL (ref 0.8–1.7)
TIBC SERPL-MCNC: 346 UG/DL (ref 250–425)
TRANSFERRIN SERPL-MCNC: 232 MG/DL (ref 250–380)
TRIGL SERPL-MCNC: 78 MG/DL (ref 30–149)
TSI SER-ACNC: 2.14 MIU/ML (ref 0.55–4.78)
VIT D+METAB SERPL-MCNC: 21.1 NG/ML (ref 30–100)
VLDLC SERPL CALC-MCNC: 14 MG/DL (ref 0–30)
WBC # BLD AUTO: 5.8 X10(3) UL (ref 4–11)

## 2024-07-02 PROCEDURE — 36415 COLL VENOUS BLD VENIPUNCTURE: CPT

## 2024-07-02 PROCEDURE — 82306 VITAMIN D 25 HYDROXY: CPT

## 2024-07-02 PROCEDURE — 83540 ASSAY OF IRON: CPT

## 2024-07-02 PROCEDURE — 84443 ASSAY THYROID STIM HORMONE: CPT

## 2024-07-02 PROCEDURE — 82728 ASSAY OF FERRITIN: CPT

## 2024-07-02 PROCEDURE — 80061 LIPID PANEL: CPT

## 2024-07-02 PROCEDURE — 84466 ASSAY OF TRANSFERRIN: CPT

## 2024-07-02 PROCEDURE — 85027 COMPLETE CBC AUTOMATED: CPT

## 2024-07-02 PROCEDURE — 80048 BASIC METABOLIC PNL TOTAL CA: CPT

## 2024-07-02 PROCEDURE — 84439 ASSAY OF FREE THYROXINE: CPT

## 2024-07-02 RX ORDER — ERGOCALCIFEROL 1.25 MG/1
50000 CAPSULE ORAL WEEKLY
Qty: 12 CAPSULE | Refills: 0 | Status: SHIPPED | OUTPATIENT
Start: 2024-07-02 | End: 2024-09-18

## (undated) NOTE — LETTER
[place] [id number]  [phone]          Patient: Sadaf Martinez   YOB: 1960   Date of Visit: 7/22/2020       Dear Employer,         July 22, 2020    At [address], w it is at all feasible for them to do so. COVID-19 is especially risky for high-risk patients (including, but not limited to, age over 61, immunosuppressed status due to disease or medication, chronic respiratory or heart conditions and diabetes).     · Hakan Nye

## (undated) NOTE — MR AVS SNAPSHOT
After Visit Summary   7/16/2020    Soto San    MRN: [id number]           Visit Information     Date & Time  7/16/2020  1:20 PM Provider  Jolynn Grande MD [address] [place] [address], [address] [place] [address],[address], [facility].  Phone  33 2. Click on the Activate your Account if you have an activation code in the box under the *New User? section. 3. Enter your [employer] Activation Code exactly as it appears below.  You will not need to use this code after you have completed the sign-up proce DO YOU KNOW WHERE TO GO? Injury & Illness are never convenient. If you are dealing with a   non-emergency, consider your options before heading to an ER.   VIDEO VISITS  Visit face-to-face with a [facility] physician or   PHILLIP using your mobile device or computer

## (undated) NOTE — ED AVS SNAPSHOT
Natalee Baker   MRN: [id number]    Department:  [facility] Emergency Department   Date of Visit:  7/4/2019           Disclosure     Insurance plans vary and the physician(s) referred by the ER may not be covered by your plan.  Please contact yo CARE PHYSICIAN AT ONCE OR RETURN IMMEDIATELY TO THE EMERGENCY DEPARTMENT. If you have been prescribed any medication(s), please fill your prescription right away and begin taking the medication(s) as directed.   If you believe that any of the medications

## (undated) NOTE — ED AVS SNAPSHOT
Claudy Bob   MRN: [id number]    Department:  [facility] Emergency Department   Date of Visit:  8/21/2018           Disclosure     Insurance plans vary and the physician(s) referred by the ER may not be covered by your plan.  Please contact y CARE PHYSICIAN AT ONCE OR RETURN IMMEDIATELY TO THE EMERGENCY DEPARTMENT. If you have been prescribed any medication(s), please fill your prescription right away and begin taking the medication(s) as directed.   If you believe that any of the medications

## (undated) NOTE — LETTER
[address]  [facility], [address] [place] [address]       09/10/18        Patient: Nena Luong   YOB: 1960   Date of Visit: 9/10/2018       Dear  Dr. Sheron Correa MD,      Thank you for referring Nena Cherise t

## (undated) NOTE — LETTER
8/5/2020              [address] [facility]         Dear Thanh Kendrick,    It was a pleasure to see you. Your most recent Pap Smear and HPV were negative.  Please make sure you call to make an appointment for an an

## (undated) NOTE — MR AVS SNAPSHOT
Ruthy Osborne   2017 9:00 AM   Office Visit   MRN:  [id number]    Description:  Female : 1960   Provider:  Elijah Alvarenga   Department:  [facility] Hematology Oncology              Visit Summary      Allergies     Seasonal